# Patient Record
Sex: MALE | Race: WHITE | NOT HISPANIC OR LATINO | URBAN - METROPOLITAN AREA
[De-identification: names, ages, dates, MRNs, and addresses within clinical notes are randomized per-mention and may not be internally consistent; named-entity substitution may affect disease eponyms.]

---

## 2017-02-03 ENCOUNTER — OFFICE VISIT - HEALTHEAST (OUTPATIENT)
Dept: VASCULAR SURGERY | Facility: CLINIC | Age: 77
End: 2017-02-03

## 2017-02-03 DIAGNOSIS — I71.40 AAA (ABDOMINAL AORTIC ANEURYSM) WITHOUT RUPTURE (H): ICD-10-CM

## 2017-02-03 DIAGNOSIS — I71.40 ABDOMINAL AORTIC ANEURYSM (AAA) WITHOUT RUPTURE (H): ICD-10-CM

## 2017-02-08 ENCOUNTER — AMBULATORY - HEALTHEAST (OUTPATIENT)
Dept: VASCULAR SURGERY | Facility: CLINIC | Age: 77
End: 2017-02-08

## 2017-08-11 ENCOUNTER — OFFICE VISIT - HEALTHEAST (OUTPATIENT)
Dept: VASCULAR SURGERY | Facility: CLINIC | Age: 77
End: 2017-08-11

## 2017-08-11 DIAGNOSIS — I71.40 ABDOMINAL AORTIC ANEURYSM (AAA) WITHOUT RUPTURE (H): ICD-10-CM

## 2017-08-11 ASSESSMENT — MIFFLIN-ST. JEOR: SCORE: 1710.55

## 2021-05-31 VITALS — HEIGHT: 72 IN | BODY MASS INDEX: 28.44 KG/M2 | WEIGHT: 210 LBS

## 2021-06-08 NOTE — PROGRESS NOTES
Annual follow-up, AAA, CTA done @ [facility] 01/30/2017 Fusiform, infrarenal abdominal aortic aneurysm, measuring 4.8 x 4.2 cm. Previously was measured at 4.0 x 3.8 cm. Minimal amount of mural thrombus.

## 2021-06-08 NOTE — PROGRESS NOTES
Faxed order for repeat CTA as well as demographics to [facility] [phone], patient will RTC in 6 months for repeat CTA.

## 2021-06-12 NOTE — PROGRESS NOTES
Follow-up, AAA, CTA on 08/08/17 measured aneurysm @ 4.8 x 4.2 cm, this is unchanged when compared to prior study done 01/30/17. Patient reports no significant belly or back pain, maintainen on ASA, but not taking a statin.

## 2021-06-12 NOTE — PROGRESS NOTES
Vascular surgery: This 76-year-old male comes in, with a known abdominal aortic aneurysm.  We have been following this.  The patient's current CT shows no increase in size, with a maximal diameter of 4.8 cm.  The patient is clearly asymptomatic.  He continues active and tries to walk for half an hour virtually every day.    I see no need for intervention at this time.  Will get a repeat surveillance CT scan in 6 months; if that is unchanged, we can increase the surveillance interval to 1 year.

## 2021-06-15 PROBLEM — I71.40 AAA (ABDOMINAL AORTIC ANEURYSM) WITHOUT RUPTURE (H): Status: ACTIVE | Noted: 2017-02-03
